# Patient Record
Sex: FEMALE | Race: WHITE | NOT HISPANIC OR LATINO | Employment: FULL TIME | URBAN - NONMETROPOLITAN AREA
[De-identification: names, ages, dates, MRNs, and addresses within clinical notes are randomized per-mention and may not be internally consistent; named-entity substitution may affect disease eponyms.]

---

## 2019-07-14 ENCOUNTER — OFFICE VISIT (OUTPATIENT)
Dept: URGENT CARE | Facility: CLINIC | Age: 59
End: 2019-07-14
Payer: COMMERCIAL

## 2019-07-14 VITALS
OXYGEN SATURATION: 97 % | SYSTOLIC BLOOD PRESSURE: 90 MMHG | HEIGHT: 66 IN | HEART RATE: 92 BPM | TEMPERATURE: 98.6 F | BODY MASS INDEX: 29.89 KG/M2 | WEIGHT: 186 LBS | RESPIRATION RATE: 14 BRPM | DIASTOLIC BLOOD PRESSURE: 60 MMHG

## 2019-07-14 DIAGNOSIS — R23.2 FLUSHING: ICD-10-CM

## 2019-07-14 DIAGNOSIS — R53.83 FATIGUE, UNSPECIFIED TYPE: ICD-10-CM

## 2019-07-14 DIAGNOSIS — F41.9 ANXIETY: ICD-10-CM

## 2019-07-14 DIAGNOSIS — R25.1 TREMULOUSNESS: ICD-10-CM

## 2019-07-14 PROCEDURE — 99203 OFFICE O/P NEW LOW 30 MIN: CPT | Performed by: PHYSICIAN ASSISTANT

## 2019-07-14 RX ORDER — ALPRAZOLAM 1 MG/1
0.5 TABLET ORAL NIGHTLY PRN
COMMUNITY

## 2019-07-14 ASSESSMENT — ENCOUNTER SYMPTOMS
SENSORY CHANGE: 0
DIZZINESS: 0
VOMITING: 0
NECK PAIN: 0
SPUTUM PRODUCTION: 0
NAUSEA: 1
FEVER: 0
LOSS OF CONSCIOUSNESS: 0
SHORTNESS OF BREATH: 0
BACK PAIN: 0
TREMORS: 1
FOCAL WEAKNESS: 0
PALPITATIONS: 0
NERVOUS/ANXIOUS: 1
ABDOMINAL PAIN: 0
CHILLS: 0
WHEEZING: 0
DIARRHEA: 0
COUGH: 0
HEADACHES: 0

## 2019-07-14 NOTE — PROGRESS NOTES
Subjective:     Zuleyka Olivo is a 58 y.o. female who presents for Other (nauseas, crying, adrenaline surges, night sweats, leg cramping now they swell, overwhelmed, body shakes [id number] )        Other    This is a recurrent problem.  The current episode started more than 1 month ago. The problem has been waxing and waning. Associated symptoms include nausea. Pertinent negatives include no abdominal pain, chest pain, chills, coughing, fever, headaches, neck pain, rash or vomiting.     Patient comes clinic describing months of symptoms of waxing and waning sensations of flushing tremulousness and rapid heart rate.  She notes anxiety associated.  She complains of persistent nausea.  She states the symptoms used to be mild and rare  Occurring only once nightly in the early hours of the morning.  She would awaken to sensation of rapid heart rate flushing anxiety and nausea.  This has increased in frequency and severity.  She denies chest pain and denies noting palpitations but is experiencing awareness of heartbeat during episodes.  She states typically she gets resolution of these episodes after minutes to an hour or so in the morning but recently has persisted into the day.  Today she has had no resolution since episode began in early hours of the morning.  Complains of persistent emotional lability becoming tearful easily.  Feeling cramping to bilateral legs.  She states history with leg cramping which has been treated with potassium supplementation as directed by primary care.  She states most recent blood work checking potassium was about 1 month ago.  She is concerned for potential adrenal dysfunction.  She denies dysuria frequency or hematuria.  She denies abdominal pain.  Complains of nausea without vomiting.  Denies cough or shortness of breath.  States she has an appointment with her primary care over the next week.    Past Medical History:   Diagnosis Date   • Heart burn    • Indigestion    • Lump or  "mass in breast    • Snoring      Past Surgical History:   Procedure Laterality Date   • BREAST BIOPSY Right 3/14/2016    Procedure: BREAST BIOPSY EXCISIONAL/MASS;  Surgeon: Maisha Tomas M.D.;  Location: SURGERY SAME DAY [facility];  Service:    • BREAST BIOPSY Left 8/4/2015    Procedure: BREAST BIOPSY WITH TERMINAL DUCT EXCISION;  Surgeon: Maisha Tomas M.D.;  Location: SURGERY SAME DAY [facility];  Service:    • BREAST BIOPSY  1/26/2009    Performed by MARRY BRYANT at SURGERY SAME DAY [facility] ORS   • HYSTERECTOMY LAPAROSCOPY  2006     Social History     Social History   • Marital status:      Spouse name: N/A   • Number of children: N/A   • Years of education: N/A     Occupational History   • Not on file.     Social History Main Topics   • Smoking status: Never Smoker   • Smokeless tobacco: Never Used   • Alcohol use Yes      Comment: 3-4/week   • Drug use: No   • Sexual activity: Not on file     Other Topics Concern   • Not on file     Social History Narrative   • No narrative on file    History reviewed. No pertinent family history. Review of Systems   Constitutional: Positive for malaise/fatigue. Negative for chills and fever.   Respiratory: Negative for cough, sputum production, shortness of breath and wheezing.    Cardiovascular: Negative for chest pain, palpitations and leg swelling.   Gastrointestinal: Positive for nausea. Negative for abdominal pain, diarrhea and vomiting.   Musculoskeletal: Negative for back pain, joint pain ( cramping legs) and neck pain.   Skin: Negative for rash.   Neurological: Positive for tremors. Negative for dizziness, sensory change, focal weakness, loss of consciousness and headaches.   Psychiatric/Behavioral: The patient is nervous/anxious.    No Known Allergies   Objective:   BP (!) 90/60   Pulse 92   Temp 37 °C (98.6 °F)   Resp 14   Ht 1.676 m (5' 6\")   Wt 84.4 kg (186 lb)   SpO2 97%   BMI 30.02 kg/m²    Physical Exam   Constitutional: She is " oriented to person, place, and time. She appears well-developed and well-nourished. No distress.   HENT:   Head: Normocephalic and atraumatic.   Right Ear: External ear normal.   Left Ear: External ear normal.   Nose: Nose normal.   Eyes: Conjunctivae and lids are normal. Right eye exhibits no discharge. Left eye exhibits no discharge. No scleral icterus.   Neck: Neck supple.   Cardiovascular: Regular rhythm and intact distal pulses.   No extrasystoles are present. Tachycardia present.    Pulmonary/Chest: Effort normal and breath sounds normal. No respiratory distress.   Musculoskeletal: Normal range of motion.        Right lower leg: She exhibits no edema.        Left lower leg: She exhibits no edema.   Neurological: She is alert and oriented to person, place, and time. She is not disoriented.   Skin: Skin is warm and dry. She is not diaphoretic. No erythema. No pallor.   Psychiatric: Her speech is normal and behavior is normal.   Nursing note and vitals reviewed.  EKG in the office reveals a normal sinus rhythm with a rate of 88. There is no ectopy, no ST elevation, depression, no signs of ischemia or infarct.      Assessment/Plan:   Assessment    1. Anxiety  - EKG    2. Fatigue, unspecified type    3. Tremulousness  - EKG    4. Flushing    Other orders  - ALPRAZolam (XANAX) 1 MG Tab; Take 1 mg by mouth at bedtime as needed for Sleep.  Patient has been directed to ER for further management/work up, I have reiterated to patient that although a provider to provider transfer was made this will not necessarily expedite the ER process - her  will drive her to [facility] for further work up today

## 2019-09-08 NOTE — PROGRESS NOTES
New Patient Consult Note  Primary care physician: Alexi Garnica M.D.    Reason for consult: Feeling of adrenaline rushes    HPI:  Zuleyka Olivo is a 58 y.o. old patient who comes in today for evaluation symptoms of  flushing, becoming tremulous with rapid heart rate, shortness of breath, hair falling out, eyes blurry, feels off balance, cramping in both legs and feet, joints ache, fatigue, and nausea that started in October on and off and then constant in June.  She is having night sweats and getting up 3 to 4 times nightly to urinate. She is having a hard time staying asleep and so is very tired. She has had a hysterectomy in 2006.  She was on hormone replacement from age 44 to 54.    ROS:  Constitutional: fatigue,No weight loss  Cardiac:  palpitations or racing heart  Resp: shortness of breath  Neuro: anxiety, No numbness or tinging in feet  Endo: No heat or cold intolerance, no polyuria or polydipsia  Skin: excessive sweating  All other systems were reviewed and were negative.    Past Medical History:  Patient Active Problem List    Diagnosis Date Noted   • Lump or mass in breast 03/14/2016   • Other specified disorders of breast 08/04/2015       Past Surgical History:  Past Surgical History:   Procedure Laterality Date   • BREAST BIOPSY Right 3/14/2016    Procedure: BREAST BIOPSY EXCISIONAL/MASS;  Surgeon: Maisha Tomas M.D.;  Location: SURGERY SAME DAY [facility] ORS;  Service:    • BREAST BIOPSY Left 8/4/2015    Procedure: BREAST BIOPSY WITH TERMINAL DUCT EXCISION;  Surgeon: Maisha Tomas M.D.;  Location: SURGERY SAME DAY [facility] ORS;  Service:    • BREAST BIOPSY  1/26/2009    Performed by ZULEYKA BRYANT at SURGERY SAME DAY [facility] ORS   • HYSTERECTOMY LAPAROSCOPY  2006       Allergies:  Patient has no known allergies.    Social History:  Social History     Socioeconomic History   • Marital status:      Spouse name: Not on file   • Number of children: Not on file   • Years of education:  "Not on file   • Highest education level: Not on file   Occupational History   • Not on file   Social Needs   • Financial resource strain: Not on file   • Food insecurity:     Worry: Not on file     Inability: Not on file   • Transportation needs:     Medical: Not on file     Non-medical: Not on file   Tobacco Use   • Smoking status: Never Smoker   • Smokeless tobacco: Never Used   Substance and Sexual Activity   • Alcohol use: Yes     Comment: 3-4/week   • Drug use: No   • Sexual activity: Not on file   Lifestyle   • Physical activity:     Days per week: Not on file     Minutes per session: Not on file   • Stress: Not on file   Relationships   • Social connections:     Talks on phone: Not on file     Gets together: Not on file     Attends Hindu service: Not on file     Active member of club or organization: Not on file     Attends meetings of clubs or organizations: Not on file     Relationship status: Not on file   • Intimate partner violence:     Fear of current or ex partner: Not on file     Emotionally abused: Not on file     Physically abused: Not on file     Forced sexual activity: Not on file   Other Topics Concern   • Not on file   Social History Narrative   • Not on file       Family History:  History reviewed. No pertinent family history.    Medications:    Current Outpatient Medications:   •  Non Formulary Request, B Complex transdermal patch and Vitamin D transdermal patch, Disp: , Rfl:   •  ALPRAZolam (XANAX) 1 MG Tab, Take 0.5 mg by mouth at bedtime as needed for Sleep., Disp: , Rfl:   •  Magnesium 500 MG Tab, Take  by mouth 2 Times a Day., Disp: , Rfl:   •  ranitidine (ZANTAC) 150 MG TABS, Take 150 mg by mouth as needed for Heartburn., Disp: , Rfl:     Labs: Reviewed    Physical Examination:  Vital signs: /70   Pulse 72   Ht 1.676 m (5' 6\")   Wt 88 kg (194 lb)   SpO2 97%   BMI 31.31 kg/m²  Body mass index is 31.31 kg/m². Patient's body mass index is 31.31 kg/m². Exercise and nutrition " counseling were performed at this visit.  Walking 20 minutes daily.  General: No apparent distress, cooperative  Eyes: No scleral icterus or discharge  ENMT: Normal on external inspection of nose, lips, normal thyroid exam  Neck: No abnormal masses on inspection  Resp: Normal effort, clear to auscultation bilaterally   CVS: Regular rate and rhythm, S1 S2 normal, no murmur   Extremities: No edema  Abdomen: abdominal obesity present  Neuro: Alert and oriented  Skin: No rash  Psych: Normal mood and affect, intact memory and able to make informed decisions    Assessment and Plan:    1. Flushing  Her flushing along with anxiety increased heart rate and other symptoms described in HPI could be related to her state of surgical menopause.  Oftentimes estrogen deficiency could manifest in this way.  Recommend to go back on estradiol supplementation.  She was on estradiol patch twice weekly.  (Total dose was around 0.05 mg in a week )    She does have slightly elevated 24-hour urine free cortisol but it is not 3 times the upper limit of normal range.  She does have inadequate suppression with 1 mg dexamethasone.  Will repeat salivary cortisol along with 24-hour urinary free cortisol again.    Total face to face time spent with patient equals 60 minutes. 35/60 minutes were spent on counseling the patient about the pathophysiology of Cushing's disease versus hypercortisolism, pituitary-adrenal axis, pituitary-gonadal axis,  side effects and benefits of Vit D and Vit B12 replacement    2. Anxiety  Rule out Cushing's although the index of suspicion is lower and she does not have classical Cushing's symptoms.  Could be secondary to menopause    3. Increased heart rate  Plan as per assessment #1 and 2    4. Leg cramping  Rule out vitamin D deficiency, B12 deficiency as the contributory factor for leg cramping    Return in about 2 months (around 11/10/2019).    Thank you for allowing me to participate in the care of this  patient.    Alfonso Nichole M.D.  09/09/19  This note was scribed  By Ling Morgan RN CDE  CC:   Alexi Garnica M.D.    This note was created using voice recognition software (Dragon). The accuracy of the dictation is limited by the abilities of the software. I have reviewed the note prior to signing, however some errors in grammar and context are still possible. If you have any questions related to this note please do not hesitate to contact our office.

## 2019-09-10 ENCOUNTER — OFFICE VISIT (OUTPATIENT)
Dept: ENDOCRINOLOGY | Facility: MEDICAL CENTER | Age: 59
End: 2019-09-10
Payer: COMMERCIAL

## 2019-09-10 VITALS
OXYGEN SATURATION: 97 % | SYSTOLIC BLOOD PRESSURE: 112 MMHG | DIASTOLIC BLOOD PRESSURE: 70 MMHG | HEIGHT: 66 IN | BODY MASS INDEX: 31.18 KG/M2 | WEIGHT: 194 LBS | HEART RATE: 72 BPM

## 2019-09-10 DIAGNOSIS — E53.8 VITAMIN B 12 DEFICIENCY: ICD-10-CM

## 2019-09-10 DIAGNOSIS — E89.40 SURGICAL MENOPAUSE: ICD-10-CM

## 2019-09-10 DIAGNOSIS — E55.9 VITAMIN D DEFICIENCY: ICD-10-CM

## 2019-09-10 DIAGNOSIS — F41.9 ANXIETY: ICD-10-CM

## 2019-09-10 DIAGNOSIS — R23.2 FLUSHING: ICD-10-CM

## 2019-09-10 DIAGNOSIS — R79.89 HIGH SERUM CORTISOL: ICD-10-CM

## 2019-09-10 DIAGNOSIS — R00.0 INCREASED HEART RATE: ICD-10-CM

## 2019-09-10 DIAGNOSIS — R25.2 LEG CRAMPING: ICD-10-CM

## 2019-09-10 PROCEDURE — 99205 OFFICE O/P NEW HI 60 MIN: CPT | Performed by: INTERNAL MEDICINE

## 2019-09-15 LAB
25(OH)D3+25(OH)D2 SERPL-MCNC: 19.4 NG/ML (ref 30–100)
ACTH PLAS-MCNC: 17.2 PG/ML (ref 7.2–63.3)
CORTIS SERPL-MCNC: 12.9 UG/DL
VIT B12 SERPL-MCNC: 708 PG/ML (ref 232–1245)

## 2019-09-27 LAB
CORTIS F 24H UR-MRATE: 112 UG/24 HR (ref 6–42)
CORTIS F UR-MCNC: 36 UG/L
CORTIS SAL-MCNC: 0.09 UG/DL
CREAT 24H UR-MRATE: 1538 MG/24 HR (ref 800–1800)
CREAT UR-MCNC: 49.6 MG/DL

## 2019-10-07 DIAGNOSIS — R79.89 HIGH SERUM CORTISOL: ICD-10-CM

## 2019-10-19 LAB
CORTIS F 24H UR-MRATE: 63 UG/24 HR (ref 6–42)
CORTIS F UR-MCNC: 27 UG/L
CORTIS SAL-MCNC: 0.3 UG/DL
CREAT 24H UR-MRATE: 1363 MG/24 HR (ref 800–1800)
CREAT UR-MCNC: 58 MG/DL

## 2019-10-21 DIAGNOSIS — R79.89 HIGH SERUM CORTISOL: ICD-10-CM

## 2019-10-30 LAB — CORTIS SAL-MCNC: 0.06 UG/DL

## 2019-11-18 DIAGNOSIS — R79.89 HIGH SERUM CORTISOL: ICD-10-CM

## 2019-11-30 LAB
CORTIS F 24H UR-MRATE: 81 UG/24 HR (ref 6–42)
CORTIS F UR-MCNC: 30 UG/L
CORTIS SAL-MCNC: 0.07 UG/DL
CREAT 24H UR-MRATE: 1534 MG/24 HR (ref 800–1800)
CREAT UR-MCNC: 56.8 MG/DL

## 2019-12-06 LAB — CORTIS SAL-MCNC: 0.13 UG/DL

## 2019-12-15 LAB
CORTIS F 24H UR-MRATE: 64 UG/24 HR (ref 6–42)
CORTIS F UR-MCNC: 24 UG/L
CREAT 24H UR-MRATE: 1479 MG/24 HR (ref 800–1800)
CREAT UR-MCNC: 55.8 MG/DL

## 2019-12-22 LAB
CORTIS F 24H UR-MRATE: 73 UG/24 HR (ref 6–42)
CORTIS F UR-MCNC: 29 UG/L
CORTIS SAL-MCNC: 0.06 UG/DL
CREAT 24H UR-MRATE: 1730 MG/24 HR (ref 800–1800)
CREAT UR-MCNC: 69.2 MG/DL

## 2020-01-15 ENCOUNTER — OFFICE VISIT (OUTPATIENT)
Dept: ENDOCRINOLOGY | Facility: MEDICAL CENTER | Age: 60
End: 2020-01-15
Payer: COMMERCIAL

## 2020-01-15 VITALS
DIASTOLIC BLOOD PRESSURE: 68 MMHG | HEART RATE: 87 BPM | HEIGHT: 66 IN | WEIGHT: 208 LBS | BODY MASS INDEX: 33.43 KG/M2 | OXYGEN SATURATION: 97 % | SYSTOLIC BLOOD PRESSURE: 108 MMHG

## 2020-01-15 DIAGNOSIS — E24.0 CUSHING'S DISEASE (HCC): ICD-10-CM

## 2020-01-15 DIAGNOSIS — R23.2 FLUSHING: ICD-10-CM

## 2020-01-15 DIAGNOSIS — R00.2 PALPITATIONS: ICD-10-CM

## 2020-01-15 DIAGNOSIS — F41.9 ANXIETY: ICD-10-CM

## 2020-01-15 PROCEDURE — 99214 OFFICE O/P EST MOD 30 MIN: CPT | Performed by: INTERNAL MEDICINE

## 2020-01-15 RX ORDER — DEXAMETHASONE 4 MG/1
8 TABLET ORAL ONCE
Qty: 2 TAB | Refills: 0 | Status: SHIPPED | OUTPATIENT
Start: 2020-01-15 | End: 2020-01-15

## 2020-01-15 NOTE — PROGRESS NOTES
Endocrinology Clinic Progress Note  PCP: Alexi Garnica M.D.    HPI:  Zuleyka Olivo is a 59 y.o. old patient who comes in today for review of her labs for possible Cushing's Syndrome and her  symptoms of flushing, anxiety and heart palpitations.       Ref. Range 10/24/2019 08:38 11/25/2019 05:02 12/3/2019 11:15 12/9/2019 11:28 12/17/2019 04:58   Creatinine, Random Urine Latest Ref Range: Not Estab. mg/dL  56.8  55.8 69.2   Creatinine, Urine Latest Ref Range: 800 - 1,800 mg/24 hr  1,534  1,479 1,730   Cortisol F, ug/L U Latest Ref Range: Undefined ug/L  30  24 29   Cortisol F, ug/24Hr U Latest Ref Range: 6 - 42 ug/24 hr  81 (H)  64 (H) 73 (H)   Salivary Cortisol, MS Latest Units: ug/dL 0.057 0.069 0.129  0.057     She has had a total of two high midnight salivary cortisol and gradually increasing urine free cortisol levels with weight gain. Also failed 1 mg overnight DST.          ROS:  Constitutional: No weight loss  Cardiac: No palpitations or racing heart  Resp: No shortness of breath  Neuro: No numbness or tinging in feet  Endo: No heat or cold intolerance, no polyuria or polydipsia  All other systems were reviewed and were negative.    Past Medical History:  Patient Active Problem List    Diagnosis Date Noted   • Lump or mass in breast 03/14/2016   • Other specified disorders of breast 08/04/2015       Past Surgical History:  Past Surgical History:   Procedure Laterality Date   • BREAST BIOPSY Right 3/14/2016    Procedure: BREAST BIOPSY EXCISIONAL/MASS;  Surgeon: Maisha Tomas M.D.;  Location: SURGERY SAME DAY [facility];  Service:    • BREAST BIOPSY Left 8/4/2015    Procedure: BREAST BIOPSY WITH TERMINAL DUCT EXCISION;  Surgeon: Maisha Tomas M.D.;  Location: SURGERY SAME DAY [facility];  Service:    • BREAST BIOPSY  1/26/2009    Performed by ZULEYKA BRYANT at SURGERY SAME DAY [facility] ORS   • HYSTERECTOMY LAPAROSCOPY  2006       Allergies:  Patient has no known allergies.    Social  History:  Social History     Socioeconomic History   • Marital status:      Spouse name: Not on file   • Number of children: Not on file   • Years of education: Not on file   • Highest education level: Not on file   Occupational History   • Not on file   Social Needs   • Financial resource strain: Not on file   • Food insecurity:     Worry: Not on file     Inability: Not on file   • Transportation needs:     Medical: Not on file     Non-medical: Not on file   Tobacco Use   • Smoking status: Never Smoker   • Smokeless tobacco: Never Used   Substance and Sexual Activity   • Alcohol use: Yes     Comment: 3-4/week   • Drug use: No   • Sexual activity: Not on file   Lifestyle   • Physical activity:     Days per week: Not on file     Minutes per session: Not on file   • Stress: Not on file   Relationships   • Social connections:     Talks on phone: Not on file     Gets together: Not on file     Attends Hindu service: Not on file     Active member of club or organization: Not on file     Attends meetings of clubs or organizations: Not on file     Relationship status: Not on file   • Intimate partner violence:     Fear of current or ex partner: Not on file     Emotionally abused: Not on file     Physically abused: Not on file     Forced sexual activity: Not on file   Other Topics Concern   • Not on file   Social History Narrative   • Not on file       Family History:  History reviewed. No pertinent family history.    Medications:    Current Outpatient Medications:   •  Non Formulary Request, B Complex transdermal patch and Vitamin D transdermal patch, Disp: , Rfl:   •  ALPRAZolam (XANAX) 1 MG Tab, Take 0.5 mg by mouth at bedtime as needed for Sleep., Disp: , Rfl:   •  ranitidine (ZANTAC) 150 MG TABS, Take 150 mg by mouth as needed for Heartburn., Disp: , Rfl:   •  Magnesium 500 MG Tab, Take  by mouth 2 Times a Day., Disp: , Rfl:     Labs: Reviewed    Physical Examination:  Vital signs: /68 (BP Location:  "Left arm, Patient Position: Sitting, BP Cuff Size: Large adult)   Pulse 87   Ht 1.676 m (5' 6\")   Wt 94.3 kg (208 lb)   SpO2 97%   BMI 33.57 kg/m²  Body mass index is 33.57 kg/m².  General: No apparent distress, cooperative  Eyes: No scleral icterus or discharge  ENMT: Normal on external inspection of nose, lips, normal thyroid exam  Neck: No abnormal masses on inspection  Resp: Normal effort, clear to auscultation bilaterally   CVS: Regular rate and rhythm, S1 S2 normal, no murmur   Extremities: No edema  Abdomen: abdominal obesity present  Neuro: Alert and oriented  Skin: No rash  Psych: Normal mood and affect, intact memory and able to make informed decisions    Assessment and Plan:  1. Flushing  Along with anxiety and palpitations; she does have hypercortisolism; plan as per assessment no. 4    2. Anxiety  Plan as per assessment no 1 and 4.     3. Palpitations  Plan as per assessment no 1 and 4.     4, Cushing's syndrome/hypercortisolism  She has had a total of two high midnight salivary cortisol and gradually increasing urine free cortisol levels with weight gain. Also failed 1 mg overnight DST.  Will do 8 mg overnight DST to distinguish between pituitary and ectopic source; will also obtain MRI of pituitary later.     Return in about 3 months (around 4/15/2020).    Thank you for allowing me to participate in the care of this patient.    Alfonso Nichole M.D.  01/15/20    CC:   Alexi Garnica M.D.    This note was created using voice recognition software (Dragon). The accuracy of the dictation is limited by the abilities of the software. I have reviewed the note prior to signing, however some errors in grammar and context are still possible. If you have any questions related to this note please do not hesitate to contact our office.   This note was scribed by Rosy Coleman RN, [facility]DA[facility]  "

## 2020-01-30 LAB
ACTH PLAS-MCNC: 2.4 PG/ML (ref 7.2–63.3)
CORTIS SERPL-MCNC: 0.9 UG/DL
DEXAMETHASONE SERPL-MCNC: 1740 NG/DL

## 2020-02-04 DIAGNOSIS — E24.0 CUSHING'S DISEASE (HCC): ICD-10-CM

## 2020-02-04 DIAGNOSIS — R79.89 HIGH SERUM CORTISOL: ICD-10-CM

## 2020-02-14 DIAGNOSIS — R23.2 FLUSHING: ICD-10-CM

## 2020-02-14 DIAGNOSIS — E53.8 VITAMIN B 12 DEFICIENCY: ICD-10-CM

## 2020-02-14 DIAGNOSIS — R00.0 INCREASED HEART RATE: ICD-10-CM

## 2020-02-14 DIAGNOSIS — R79.89 HIGH SERUM CORTISOL: ICD-10-CM

## 2020-02-14 DIAGNOSIS — E55.9 VITAMIN D DEFICIENCY: ICD-10-CM

## 2020-02-14 DIAGNOSIS — E89.40 SURGICAL MENOPAUSE: ICD-10-CM

## 2020-02-15 ENCOUNTER — APPOINTMENT (OUTPATIENT)
Dept: RADIOLOGY | Facility: MEDICAL CENTER | Age: 60
End: 2020-02-15
Attending: INTERNAL MEDICINE
Payer: COMMERCIAL

## 2020-02-18 ENCOUNTER — TELEPHONE (OUTPATIENT)
Dept: HEALTH INFORMATION MANAGEMENT | Facility: MEDICAL CENTER | Age: 60
End: 2020-02-18

## 2020-02-20 NOTE — TELEPHONE ENCOUNTER
Zuleyka calls again requesting order for labs be faxed to [facility] [phone].  Left message confirming orders were faxed to that number and fax confirmation received yesterday at 4:21 pm.  I recommend Zuleyka call [facility] as they were closed when fax was sent and received, so order has likely not been processed yet

## 2020-02-22 LAB
ALBUMIN SERPL-MCNC: 4.8 G/DL (ref 3.8–4.9)
ALBUMIN/GLOB SERPL: 2.1 {RATIO} (ref 1.2–2.2)
ALP SERPL-CCNC: 106 IU/L (ref 39–117)
ALT SERPL-CCNC: 21 IU/L (ref 0–32)
AST SERPL-CCNC: 19 IU/L (ref 0–40)
BILIRUB SERPL-MCNC: 0.3 MG/DL (ref 0–1.2)
BUN SERPL-MCNC: 16 MG/DL (ref 8–27)
BUN/CREAT SERPL: 18 (ref 12–28)
CALCIUM SERPL-MCNC: 9.5 MG/DL (ref 8.7–10.3)
CHLORIDE SERPL-SCNC: 104 MMOL/L (ref 96–106)
CO2 SERPL-SCNC: 24 MMOL/L (ref 20–29)
CREAT SERPL-MCNC: 0.87 MG/DL (ref 0.57–1)
GLOBULIN SER CALC-MCNC: 2.3 G/DL (ref 1.5–4.5)
GLUCOSE SERPL-MCNC: 92 MG/DL (ref 65–99)
POTASSIUM SERPL-SCNC: 4.2 MMOL/L (ref 3.5–5.2)
PROT SERPL-MCNC: 7.1 G/DL (ref 6–8.5)
SODIUM SERPL-SCNC: 143 MMOL/L (ref 134–144)

## 2020-02-23 ENCOUNTER — APPOINTMENT (OUTPATIENT)
Dept: RADIOLOGY | Facility: MEDICAL CENTER | Age: 60
End: 2020-02-23
Attending: INTERNAL MEDICINE
Payer: COMMERCIAL

## 2020-02-26 ENCOUNTER — HOSPITAL ENCOUNTER (OUTPATIENT)
Dept: RADIOLOGY | Facility: MEDICAL CENTER | Age: 60
End: 2020-02-26
Attending: INTERNAL MEDICINE
Payer: COMMERCIAL

## 2020-02-26 DIAGNOSIS — E24.0 CUSHING'S DISEASE (HCC): ICD-10-CM

## 2020-02-26 DIAGNOSIS — R79.89 HIGH SERUM CORTISOL: ICD-10-CM

## 2020-02-26 PROCEDURE — A9576 INJ PROHANCE MULTIPACK: HCPCS | Performed by: INTERNAL MEDICINE

## 2020-02-26 PROCEDURE — 700117 HCHG RX CONTRAST REV CODE 255: Performed by: INTERNAL MEDICINE

## 2020-02-26 PROCEDURE — 70553 MRI BRAIN STEM W/O & W/DYE: CPT

## 2020-02-26 RX ADMIN — GADOTERIDOL 20 ML: 279.3 INJECTION, SOLUTION INTRAVENOUS at 17:20

## 2020-03-03 ENCOUNTER — TELEPHONE (OUTPATIENT)
Dept: ENDOCRINOLOGY | Facility: MEDICAL CENTER | Age: 60
End: 2020-03-03

## 2020-03-03 DIAGNOSIS — E24.0 CUSHING'S DISEASE (HCC): ICD-10-CM

## 2020-03-03 NOTE — TELEPHONE ENCOUNTER
Talked to the patient and discussed the future plan for the work-up of pituitary Cushing's disease.  Discussed that she will need referral and need to be Seen at [facility] for inferior petrosal sinus sampling most likely to figure out where the excess ACTH is coming from.    Based on this the further treatment will be to remove this surgically and this should also be done at [facility].

## 2020-03-11 DIAGNOSIS — E24.0 CUSHING'S DISEASE (HCC): ICD-10-CM

## 2020-03-16 DIAGNOSIS — E24.0 CUSHING'S DISEASE (HCC): ICD-10-CM

## 2020-04-29 ENCOUNTER — APPOINTMENT (OUTPATIENT)
Dept: ENDOCRINOLOGY | Facility: MEDICAL CENTER | Age: 60
End: 2020-04-29
Payer: COMMERCIAL

## 2020-05-14 ENCOUNTER — HOSPITAL ENCOUNTER (OUTPATIENT)
Dept: RADIOLOGY | Facility: MEDICAL CENTER | Age: 60
End: 2020-05-14
Payer: COMMERCIAL

## 2020-05-14 ENCOUNTER — HOSPITAL ENCOUNTER (OUTPATIENT)
Dept: LAB | Facility: MEDICAL CENTER | Age: 60
End: 2020-05-14
Payer: COMMERCIAL

## 2020-05-14 DIAGNOSIS — D35.00 BENIGN NEOPLASM OF ADRENAL GLAND, UNSPECIFIED LATERALITY: ICD-10-CM

## 2020-05-14 LAB
BUN SERPL-MCNC: 18 MG/DL (ref 8–22)
CREAT SERPL-MCNC: 0.83 MG/DL (ref 0.5–1.4)

## 2020-05-14 PROCEDURE — 84520 ASSAY OF UREA NITROGEN: CPT

## 2020-05-14 PROCEDURE — 74170 CT ABD WO CNTRST FLWD CNTRST: CPT

## 2020-05-14 PROCEDURE — 82565 ASSAY OF CREATININE: CPT

## 2020-05-14 PROCEDURE — 36415 COLL VENOUS BLD VENIPUNCTURE: CPT

## 2020-05-14 PROCEDURE — 700117 HCHG RX CONTRAST REV CODE 255

## 2020-05-14 RX ADMIN — IOHEXOL 100 ML: 350 INJECTION, SOLUTION INTRAVENOUS at 15:11

## 2020-11-04 ENCOUNTER — TELEPHONE (OUTPATIENT)
Dept: ENDOCRINOLOGY | Facility: MEDICAL CENTER | Age: 60
End: 2020-11-04

## 2020-11-04 NOTE — TELEPHONE ENCOUNTER
Phone Number Called: [phone]    Call outcome: Left detailed message for patient. Informed to call back with any additional questions.    Message: Contacted patient to schedule appointment. There was no answer. I asked patient to please call us back to schedule and provided our phone number.

## 2020-11-04 NOTE — TELEPHONE ENCOUNTER
----- Message from Pola Das M.D. sent at 11/3/2020  8:39 AM PST -----  She wants an appointment on the week of November 16

## 2020-11-30 ENCOUNTER — TELEMEDICINE (OUTPATIENT)
Dept: ENDOCRINOLOGY | Facility: MEDICAL CENTER | Age: 60
End: 2020-11-30
Attending: INTERNAL MEDICINE
Payer: COMMERCIAL

## 2020-11-30 DIAGNOSIS — Z98.890 HISTORY OF PITUITARY SURGERY: ICD-10-CM

## 2020-11-30 DIAGNOSIS — R23.2 FLUSHING: ICD-10-CM

## 2020-11-30 DIAGNOSIS — E24.0 CUSHING'S DISEASE (HCC): ICD-10-CM

## 2020-11-30 PROCEDURE — 99214 OFFICE O/P EST MOD 30 MIN: CPT | Mod: 95,CR | Performed by: INTERNAL MEDICINE

## 2020-11-30 NOTE — PROGRESS NOTES
Chief Complaint: Follow up for Hx of Pituitary tumor s/p TSS, history of possible Cushing' disease.  Patient was presented for a telehealth consultation via secure and encrypted videoconferencing technology. This encounter was conducted via Zoom . Verbal consent was obtained. Patient's identity was verified.    HPI:     Zuleyka Olivo is a 60 y.o. female here for follow up evaluation of pituitary tumor s/p TSS.  She was first diagnosed with a pituitary tumor in 2019.  She saw Dr. Nichole.  She had an indeterminate work-up for possible Cushing's disease.  She had a abnormal 24 urine cortisol but the levels were never greater than 2-3 times upper limit of normal and she had 2 abnormal midnight saliva cortisol test but the rest were normal.  Pituitary MRI in 2020 showed a 4 mm micro adenoma.  Because of her other symptoms of flushing and anxiety she had a work-up to rule out pheochromocytoma but overall she had a negative work-up for pheochromocytoma at [facility] which included a CT scan of her adrenals and measurement of her plasma fractionated metanephrines.  I do not have her baseline thyroid function test but she claims that this was normal with her primary care Mendoza.    She finally underwent pituitary adenoidectomy on November 13, 2020 at [facility]. Postoperatively she has been on very low dose dexamethasone.  They are monitoring her salt levels and her sodium was reported to be low at 131 postoperatively and she was asked to follow fluid restriction.  She denies significant polyuria.  She claims to be feeling more anxious and dizzy and lightheaded since starting the low-dose dexamethasone 0.25 mg daily.  She did have lab work at [facility] post surgery which showed a normal ACTH and cortisol level but I do not have the results on hand        Patient's medications, allergies, and social histories were reviewed and updated as appropriate.      ROS:       CONS:     No fever, no chills   EYES:     No diplopia, no blurry vision    CV:           No chest pain, no palpitations   PULM:     No SOB, no cough, no hemoptysis.   GI:            No nausea, no vomiting, no diarrhea, no constipation   ENDO:     No polyuria, no polydipsia, no heat intolerance, no cold intolerance     Past Medical History:  Problem List:  2016-03: Lump or mass in breast  2015-08: Other specified disorders of breast      Past Surgical History:  Past Surgical History:   Procedure Laterality Date   • BREAST BIOPSY Right 3/14/2016    Procedure: BREAST BIOPSY EXCISIONAL/MASS;  Surgeon: Maisha Tomas M.D.;  Location: SURGERY SAME DAY [facility] ORS;  Service:    • BREAST BIOPSY Left 8/4/2015    Procedure: BREAST BIOPSY WITH TERMINAL DUCT EXCISION;  Surgeon: Maisha Tomas M.D.;  Location: SURGERY SAME DAY [facility] ORS;  Service:    • BREAST BIOPSY  1/26/2009    Performed by MARRY BRYANT at SURGERY SAME DAY [facility] ORS   • HYSTERECTOMY LAPAROSCOPY  2006        Allergies:  Nitrofurantoin     Social History:  Social History     Tobacco Use   • Smoking status: Never Smoker   • Smokeless tobacco: Never Used   Substance Use Topics   • Alcohol use: Yes     Comment: 3-4/week   • Drug use: No        Family History:   family history is not on file.      PHYSICAL EXAM:   Vital signs: There were no vitals taken for this visit.  GENERAL: Well-developed, well-nourished in no apparent distress.   EYE:  No ocular asymmetry, PERRLA  HENT: Pink, moist mucous membranes.    NECK: No thyromegaly.   CARDIOVASCULAR:  No murmurs  LUNGS: Clear breath sounds  ABDOMEN: Soft, nontender   EXTREMITIES: No clubbing, cyanosis, or edema.   NEUROLOGICAL: No gross focal motor abnormalities   LYMPH: No cervical adenopathy seen  SKIN: No rashes, lesions.       Labs:  Lab Results   Component Value Date/Time    WBC 6.7 01/20/2009 12:27 PM    RBC 4.43 01/20/2009 12:27 PM    HEMOGLOBIN 14.5 01/20/2009 12:27 PM    MCV 92.3 01/20/2009 12:27 PM    MCH 32.7 01/20/2009 12:27 PM    MCHC 35.4 (H) 01/20/2009 12:27 PM     RDW 12.2 01/20/2009 12:27 PM    MPV 7.5 01/20/2009 12:27 PM       Lab Results   Component Value Date/Time    SODIUM 143 02/21/2020 10:54 AM    POTASSIUM 4.2 02/21/2020 10:54 AM    CHLORIDE 104 02/21/2020 10:54 AM    CO2 24 02/21/2020 10:54 AM    GLUCOSE 92 02/21/2020 10:54 AM    BUN 18 05/14/2020 02:11 PM    CREATININE 0.83 05/14/2020 02:11 PM    CALCIUM 9.5 02/21/2020 10:54 AM    ASTSGOT 19 02/21/2020 10:54 AM    ALTSGPT 21 02/21/2020 10:54 AM    TBILIRUBIN 0.3 02/21/2020 10:54 AM    ALBUMIN 4.8 02/21/2020 10:54 AM    TOTPROTEIN 7.1 02/21/2020 10:54 AM    GLOBULIN 2.3 02/21/2020 10:54 AM    AGRATIO 2.1 02/21/2020 10:54 AM       No results found for: TSHULTRASEN  No results found for: FREEDIR  No results found for: FREET3  No results found for: THYSTIMIG      Imaging:    ASSESSMENT/PLAN:     1. Cushing's disease (HCC)  Unstable  She will continue follow-up with Dr. Monge temporarily post surgery  Explained to patient that she may be able to wean off dexamethasone pretty soon or switch to hydrocortisone  Recommend follow-up in 6 months with labs including morning ACTH, cortisol, CMP, TSH, free T4, and alpha subunit    2. Flushing  Stable she does not have definite Cushing's or pheochromocytoma to explain this    3. History of pituitary surgery  Patient underwent pituitary surgery      Return in about 6 months (around 5/30/2021).      This patient during there office visit today was started on a new medication.  Side effects of the new medication were discussed with the patient today in the office.     Thank you kindly for allowing me to participate in the endocrine care plan for this patient.    Pola Das MD, FACE, [facility]  11/30/20    CC:   ANDREA Baum

## 2020-12-24 ENCOUNTER — PATIENT MESSAGE (OUTPATIENT)
Dept: ENDOCRINOLOGY | Facility: MEDICAL CENTER | Age: 60
End: 2020-12-24

## 2020-12-28 DIAGNOSIS — F41.9 ANXIETY: ICD-10-CM

## 2020-12-28 DIAGNOSIS — R00.2 PALPITATIONS: ICD-10-CM

## 2021-01-07 ENCOUNTER — HOSPITAL ENCOUNTER (OUTPATIENT)
Dept: LAB | Facility: MEDICAL CENTER | Age: 61
End: 2021-01-07
Attending: INTERNAL MEDICINE
Payer: COMMERCIAL

## 2021-01-07 DIAGNOSIS — R23.2 FLUSHING: ICD-10-CM

## 2021-01-07 DIAGNOSIS — E24.0 CUSHING'S DISEASE (HCC): ICD-10-CM

## 2021-01-07 DIAGNOSIS — Z98.890 HISTORY OF PITUITARY SURGERY: ICD-10-CM

## 2021-01-07 LAB
CORTIS SERPL-MCNC: 13 UG/DL (ref 0–23)
FSH SERPL-ACNC: 34.1 MIU/ML
LH SERPL-ACNC: 27.1 IU/L
PROLACTIN SERPL-MCNC: 3.1 NG/ML (ref 2.8–26)

## 2021-01-07 PROCEDURE — 36415 COLL VENOUS BLD VENIPUNCTURE: CPT

## 2021-01-07 PROCEDURE — 84146 ASSAY OF PROLACTIN: CPT

## 2021-01-07 PROCEDURE — 84305 ASSAY OF SOMATOMEDIN: CPT

## 2021-01-07 PROCEDURE — 83835 ASSAY OF METANEPHRINES: CPT

## 2021-01-07 PROCEDURE — 82024 ASSAY OF ACTH: CPT

## 2021-01-07 PROCEDURE — 83002 ASSAY OF GONADOTROPIN (LH): CPT

## 2021-01-07 PROCEDURE — 83520 IMMUNOASSAY QUANT NOS NONAB: CPT

## 2021-01-07 PROCEDURE — 83001 ASSAY OF GONADOTROPIN (FSH): CPT

## 2021-01-07 PROCEDURE — 82533 TOTAL CORTISOL: CPT

## 2021-01-09 LAB — ACTH PLAS-MCNC: 22.8 PG/ML (ref 7.2–63.3)

## 2021-01-10 LAB
IGF-I SERPL-MCNC: 188 NG/ML (ref 41–243)
IGF-I Z-SCORE SERPL: 1.1

## 2021-01-11 ENCOUNTER — HOSPITAL ENCOUNTER (OUTPATIENT)
Facility: MEDICAL CENTER | Age: 61
End: 2021-01-11
Attending: INTERNAL MEDICINE
Payer: COMMERCIAL

## 2021-01-11 DIAGNOSIS — R00.2 PALPITATIONS: ICD-10-CM

## 2021-01-11 DIAGNOSIS — F41.9 ANXIETY: ICD-10-CM

## 2021-01-11 LAB
CREAT 24H UR-MSRATE: 1613 MG/24 HR (ref 800–1800)
CREAT UR-MCNC: 64.5 MG/DL
METANEPHS SERPL-SCNC: 0.28 NMOL/L (ref 0–0.49)
NORMETANEPHRINE SERPL-SCNC: 0.83 NMOL/L (ref 0–0.89)
SPECIMEN VOL UR: 2500 ML

## 2021-01-11 PROCEDURE — 82570 ASSAY OF URINE CREATININE: CPT

## 2021-01-11 PROCEDURE — 82384 ASSAY THREE CATECHOLAMINES: CPT

## 2021-01-11 PROCEDURE — 84585 ASSAY OF URINE VMA: CPT

## 2021-01-11 PROCEDURE — 81050 URINALYSIS VOLUME MEASURE: CPT

## 2021-01-11 PROCEDURE — 83835 ASSAY OF METANEPHRINES: CPT

## 2021-01-15 LAB
CATECHOLS UR-IMP: ABNORMAL
COLLECT DURATION TIME SPEC: 24 HRS
CREAT 24H UR-MCNC: 68 MG/DL
CREAT 24H UR-MRATE: 1700 MG/D (ref 500–1400)
DOPAMINE 24H UR-MRATE: 272 UG/D (ref 71–485)
DOPAMINE UR-MCNC: 109 UG/L
DOPAMINE/CREAT UR: 160 UG/G CRT (ref 0–250)
EPINEPH 24H UR-MRATE: 8 UG/D (ref 1–14)
EPINEPH UR-MCNC: 3 UG/L
EPINEPH/CREAT UR: 4 UG/G CRT (ref 0–20)
METANEPH 24H UR-MCNC: 64 UG/L
METANEPH 24H UR-MRATE: 160 UG/D (ref 36–229)
METANEPH/CREAT 24H UR: 94 UG/G CRT (ref 0–300)
NOREPINEPH 24H UR-MRATE: 32 UG/D (ref 14–120)
NOREPINEPH UR-MCNC: 13 UG/L
NOREPINEPH/CREAT UR: 19 UG/G CRT (ref 0–45)
NORMETANEPHRINE 24H UR-MCNC: 98 UG/L
NORMETANEPHRINE 24H UR-MRATE: 245 UG/D (ref 95–650)
NORMETANEPHRINE/CREAT 24H UR: 144 UG/G CRT (ref 0–400)
SPECIMEN VOL ?TM UR: 2500 ML
VMA & CREAT 24H UR-IMP: ABNORMAL
VMA 24H UR-MCNC: 1.5 MG/L
VMA 24H UR-MRATE: 3.8 MG/D (ref 0–7)
VMA/CREAT 24H UR: 2 MG/GCR (ref 0–6)

## 2021-01-22 LAB — MISCELLANEOUS LAB RESULT MISCLAB: NORMAL

## 2021-06-01 ENCOUNTER — APPOINTMENT (OUTPATIENT)
Dept: ENDOCRINOLOGY | Facility: MEDICAL CENTER | Age: 61
End: 2021-06-01
Attending: INTERNAL MEDICINE
Payer: COMMERCIAL

## 2021-09-21 ENCOUNTER — APPOINTMENT (OUTPATIENT)
Dept: RADIOLOGY | Facility: MEDICAL CENTER | Age: 61
End: 2021-09-21
Attending: INTERNAL MEDICINE
Payer: COMMERCIAL

## 2021-09-28 ENCOUNTER — HOSPITAL ENCOUNTER (OUTPATIENT)
Dept: RADIOLOGY | Facility: MEDICAL CENTER | Age: 61
End: 2021-09-28
Attending: INTERNAL MEDICINE
Payer: COMMERCIAL

## 2021-09-28 DIAGNOSIS — C74.10: ICD-10-CM

## 2021-09-29 ENCOUNTER — HOSPITAL ENCOUNTER (OUTPATIENT)
Dept: RADIOLOGY | Facility: MEDICAL CENTER | Age: 61
End: 2021-09-29
Attending: INTERNAL MEDICINE
Payer: COMMERCIAL

## 2021-09-29 DIAGNOSIS — C74.10: ICD-10-CM

## 2021-09-29 PROCEDURE — A9582 IODINE I-123 IOBENGUANE: HCPCS

## 2021-09-30 ENCOUNTER — HOSPITAL ENCOUNTER (OUTPATIENT)
Dept: RADIOLOGY | Facility: MEDICAL CENTER | Age: 61
End: 2021-09-30
Attending: INTERNAL MEDICINE
Payer: COMMERCIAL

## 2022-01-14 ENCOUNTER — HOSPITAL ENCOUNTER (OUTPATIENT)
Dept: RADIOLOGY | Facility: MEDICAL CENTER | Age: 62
End: 2022-01-14
Attending: INTERNAL MEDICINE
Payer: COMMERCIAL

## 2022-01-14 DIAGNOSIS — C7A.1 MALIGNANT POORLY DIFFERENTIATED NEUROENDOCRINE CARCINOMA (HCC): ICD-10-CM

## 2022-01-14 PROCEDURE — A9592 CT-PETCT-NEUROENDOCRINE SKULL BASE TO MID-THIGH: HCPCS

## 2022-02-28 ENCOUNTER — APPOINTMENT (OUTPATIENT)
Dept: RADIOLOGY | Facility: MEDICAL CENTER | Age: 62
End: 2022-02-28
Attending: NURSE PRACTITIONER
Payer: COMMERCIAL

## 2022-02-28 DIAGNOSIS — R10.31 ABDOMINAL PAIN, RIGHT LOWER QUADRANT: ICD-10-CM

## 2022-02-28 PROCEDURE — 74183 MRI ABD W/O CNTR FLWD CNTR: CPT

## 2022-02-28 PROCEDURE — 700117 HCHG RX CONTRAST REV CODE 255: Performed by: NURSE PRACTITIONER

## 2022-02-28 PROCEDURE — A9576 INJ PROHANCE MULTIPACK: HCPCS | Performed by: NURSE PRACTITIONER

## 2022-02-28 RX ADMIN — GADOTERIDOL 20 ML: 279.3 INJECTION, SOLUTION INTRAVENOUS at 17:35

## 2022-10-26 ENCOUNTER — HOSPITAL ENCOUNTER (OUTPATIENT)
Dept: RADIOLOGY | Facility: MEDICAL CENTER | Age: 62
End: 2022-10-26
Attending: INTERNAL MEDICINE
Payer: COMMERCIAL

## 2022-10-26 DIAGNOSIS — R10.2 ADNEXAL TENDERNESS: ICD-10-CM

## 2022-10-26 PROCEDURE — 76830 TRANSVAGINAL US NON-OB: CPT

## 2024-08-27 ENCOUNTER — APPOINTMENT (OUTPATIENT)
Dept: RADIOLOGY | Facility: MEDICAL CENTER | Age: 64
End: 2024-08-27
Attending: SURGERY
Payer: COMMERCIAL

## 2024-09-25 ENCOUNTER — APPOINTMENT (OUTPATIENT)
Dept: RADIOLOGY | Facility: MEDICAL CENTER | Age: 64
End: 2024-09-25
Attending: SURGERY
Payer: COMMERCIAL

## 2024-10-11 ENCOUNTER — APPOINTMENT (OUTPATIENT)
Dept: RADIOLOGY | Facility: MEDICAL CENTER | Age: 64
End: 2024-10-11
Attending: SURGERY
Payer: COMMERCIAL

## 2024-11-09 ENCOUNTER — APPOINTMENT (OUTPATIENT)
Dept: RADIOLOGY | Facility: MEDICAL CENTER | Age: 64
End: 2024-11-09
Attending: SURGERY
Payer: COMMERCIAL

## 2024-12-11 ENCOUNTER — APPOINTMENT (OUTPATIENT)
Dept: RADIOLOGY | Facility: MEDICAL CENTER | Age: 64
End: 2024-12-11
Attending: SURGERY
Payer: COMMERCIAL